# Patient Record
Sex: FEMALE | Race: WHITE | NOT HISPANIC OR LATINO | URBAN - NONMETROPOLITAN AREA
[De-identification: names, ages, dates, MRNs, and addresses within clinical notes are randomized per-mention and may not be internally consistent; named-entity substitution may affect disease eponyms.]

---

## 2017-01-24 NOTE — PATIENT DISCUSSION
Post-op Yag laser Counseling- Patient instructed that he/she may experience a temporary onset of floater post yag laser. Patient understands to monitor vision for any changes including flashes, decreased vision or loss of vision. Patient told to call office immediately if experiences any changes above. Patient instructed on RD precautions. Patient verbalizes understanding.

## 2017-08-23 NOTE — PATIENT DISCUSSION
AMD (DRY), OD: PRESCRIBE AREDS 2 VITAMINS / AMSLER GRID QD/ UV PROTECTION. SMOKING CESSATION EMPHASIZED. RETURN FOR FOLLOW-UP AS SCHEDULED.

## 2018-04-09 PROBLEM — H52.4: Noted: 2017-04-07

## 2018-04-09 PROBLEM — E11.9: Noted: 2018-04-09

## 2019-04-10 ENCOUNTER — IMPORTED ENCOUNTER (OUTPATIENT)
Dept: URBAN - NONMETROPOLITAN AREA CLINIC 1 | Facility: CLINIC | Age: 49
End: 2019-04-10

## 2019-04-10 PROCEDURE — 92015 DETERMINE REFRACTIVE STATE: CPT

## 2019-04-10 PROCEDURE — 92014 COMPRE OPH EXAM EST PT 1/>: CPT

## 2019-04-10 NOTE — PATIENT DISCUSSION
DM s -Stressed the importance of keeping blood sugars under control blood pressure under control and weight normalization and regular visits with PCP. -Explained the possible effects of poorly controlled diabetes and the damage that diabetes can cause to ocular health. -Patient to check HgbA1C.-Pt instructed to contact our office with any vision changes. Presbyopia-Discussed diagnosis with patient.; 's Notes:  at [employer]

## 2020-05-13 NOTE — PATIENT DISCUSSION
AMD (DRY DRUSEN), OD__:  RECOMMEND FOLLOW UP ON A YEARLY BASIS. RETURN FOR FOLLOW-UP AS SCHEDULED. Filled for #1 more pill of xarelto    For reflux start prilosec 40 mg daily x 1 month

## 2020-06-16 NOTE — PATIENT DISCUSSION
PT DECIDED AFTER SEEING DR BERGERON THAT SHE WANTED REFRACTION.   REFRACTION DONE AND RX GIVEN TO PT.

## 2020-08-05 ENCOUNTER — IMPORTED ENCOUNTER (OUTPATIENT)
Dept: URBAN - NONMETROPOLITAN AREA CLINIC 1 | Facility: CLINIC | Age: 50
End: 2020-08-05

## 2020-08-05 PROCEDURE — 92015 DETERMINE REFRACTIVE STATE: CPT

## 2020-08-05 PROCEDURE — 92014 COMPRE OPH EXAM EST PT 1/>: CPT

## 2020-08-05 NOTE — PATIENT DISCUSSION
"DM s -Stressed the importance of keeping blood sugars under control blood pressure under control and weight normalization and regular visits with PCP. -Explained the possible effects of poorly controlled diabetes and the damage that diabetes can cause to ocular health. -Patient to check HgbA1C.-Pt instructed to contact our office with any vision changes. Presbyopia-Discussed diagnosis with patient. ""Updated spec Rx given.   Recommend lens that will provide comfort as well as protect safety and health of eyes.; Dr's Notes:  at [facility]"

## 2022-01-05 ENCOUNTER — IMPORTED ENCOUNTER (OUTPATIENT)
Dept: URBAN - NONMETROPOLITAN AREA CLINIC 1 | Facility: CLINIC | Age: 52
End: 2022-01-05

## 2022-01-05 PROCEDURE — 92015 DETERMINE REFRACTIVE STATE: CPT

## 2022-01-05 PROCEDURE — 92014 COMPRE OPH EXAM EST PT 1/>: CPT

## 2022-01-05 NOTE — PATIENT DISCUSSION
"DM s -Stressed the importance of keeping blood sugars under control blood pressure under control and weight normalization and regular visits with PCP. -Explained the possible effects of poorly controlled diabetes and the damage that diabetes can cause to ocular health. -Patient to check HgbA1C.-Pt instructed to contact our office with any vision changes. Presbyopia-Discussed diagnosis with patient. ""Updated spec Rx given.   Recommend lens that will provide comfort as well as protect safety and health of eyes.; Dr's Notes:  at [facility]"

## 2022-04-09 ASSESSMENT — TONOMETRY
OS_IOP_MMHG: 16
OD_IOP_MMHG: 16
OS_IOP_MMHG: 16
OS_IOP_MMHG: 16

## 2022-04-09 ASSESSMENT — VISUAL ACUITY
OU_CC: 20/20
OD_CC: J2
OS_CC: J2
OS_CC: 20/20
OD_CC: 20/40
OD_CC: 20/20 BLURRY
OS_SC: 20/20
OD_SC: 20/20

## 2023-11-14 ENCOUNTER — ESTABLISHED PATIENT (OUTPATIENT)
Dept: RURAL CLINIC 1 | Facility: CLINIC | Age: 53
End: 2023-11-14

## 2023-11-14 DIAGNOSIS — H52.4: ICD-10-CM

## 2023-11-14 DIAGNOSIS — E11.9: ICD-10-CM

## 2023-11-14 PROCEDURE — 92015 DETERMINE REFRACTIVE STATE: CPT

## 2023-11-14 PROCEDURE — 92014 COMPRE OPH EXAM EST PT 1/>: CPT

## 2023-11-14 ASSESSMENT — VISUAL ACUITY
OD_SC: 20/25
OS_OTHER: NCC W/ OTC +1.50
OS_CC: 20/25
OD_CC: 20/25
OU_OTHER: NCC W/ OTC +1.50
OD_OTHER: NCC W/ OTC +1.50
OU_CC: 20/25
OU_SC: 20/20
OS_SC: 20/20

## 2023-11-14 ASSESSMENT — TONOMETRY
OD_IOP_MMHG: 16
OS_IOP_MMHG: 16

## 2024-11-15 ENCOUNTER — COMPREHENSIVE EXAM (OUTPATIENT)
Dept: RURAL CLINIC 1 | Facility: CLINIC | Age: 54
End: 2024-11-15

## 2024-11-15 DIAGNOSIS — E11.9: ICD-10-CM

## 2024-11-15 PROCEDURE — 92014 COMPRE OPH EXAM EST PT 1/>: CPT
